# Patient Record
(demographics unavailable — no encounter records)

---

## 2025-03-03 NOTE — HISTORY OF PRESENT ILLNESS
[FreeTextEntry6] : Few days of itching and pink eyes.  No cold cough runny nose.  Child says began with body wash he used on hair, burned eyes. Mom thinks not from that.

## 2025-03-03 NOTE — PHYSICAL EXAM
[NL] : warm, clear [FreeTextEntry5] : mildly pink conjunctiva bilat. Lids mild pink bilat, more on R ,lower lids, no swelling. Itchy, rubs eyes  [FreeTextEntry3] : cerumen bilat

## 2025-03-03 NOTE — DISCUSSION/SUMMARY
[FreeTextEntry1] : Eye irritation bilat. Adenovirus in community but otherwise he feels well.   P Tx as allergy Zyrtec Zaditor To ophto if not improving in a few days. Cool compress, minimize rubbing.

## 2025-04-10 NOTE — HISTORY OF PRESENT ILLNESS
[NO] : No [Mother] : mother [Normal] : Normal [Brushing teeth] : Brushing teeth [Yes] : Patient goes to dentist yearly [Grade ___] : Grade [unfilled] [No] : No cigarette smoke exposure [Car seat in back seat] : Car seat in back seat [Carbon Monoxide Detectors] : Carbon monoxide detectors [Smoke Detectors] : Smoke detectors [Up to date] : Up to date [de-identified] : all foods [FreeTextEntry1] : JUAN J RAMOS is a 6 year here for well  runny nose and sneezing itchy eyes last two weeks no fever, improved with zyrtec

## 2025-04-10 NOTE — PHYSICAL EXAM
[Alert] : alert [No Acute Distress] : no acute distress [Normocephalic] : normocephalic [Conjunctivae with no discharge] : conjunctivae with no discharge [PERRL] : PERRL [EOMI Bilateral] : EOMI bilateral [Auricles Well Formed] : auricles well formed [Clear Tympanic membranes with present light reflex and bony landmarks] : clear tympanic membranes with present light reflex and bony landmarks [No Discharge] : no discharge [Nares Patent] : nares patent [Pink Nasal Mucosa] : pink nasal mucosa [Palate Intact] : palate intact [Nonerythematous Oropharynx] : nonerythematous oropharynx [Supple, full passive range of motion] : supple, full passive range of motion [No Palpable Masses] : no palpable masses [Symmetric Chest Rise] : symmetric chest rise [Clear to Auscultation Bilaterally] : clear to auscultation bilaterally [Regular Rate and Rhythm] : regular rate and rhythm [Normal S1, S2 present] : normal S1, S2 present [No Murmurs] : no murmurs [+2 Femoral Pulses] : +2 femoral pulses [Soft] : soft [NonTender] : non tender [Non Distended] : non distended [Normoactive Bowel Sounds] : normoactive bowel sounds [No Hepatomegaly] : no hepatomegaly [No Splenomegaly] : no splenomegaly [Micheal: _____] : Micheal [unfilled] [Circumcised] : circumcised [Testicles Descended Bilaterally] : testicles descended bilaterally [Patent] : patent [No fissures] : no fissures [No Abnormal Lymph Nodes Palpated] : no abnormal lymph nodes palpated [No Gait Asymmetry] : no gait asymmetry [No pain or deformities with palpation of bone, muscles, joints] : no pain or deformities with palpation of bone, muscles, joints [Normal Muscle Tone] : normal muscle tone [Straight] : straight [+2 Patella DTR] : +2 patella DTR [Cranial Nerves Grossly Intact] : cranial nerves grossly intact [No Rash or Lesions] : no rash or lesions

## 2025-04-10 NOTE — DISCUSSION/SUMMARY
[FreeTextEntry1] : Continue balanced diet with all food groups. Brush teeth twice a day with toothbrush. Recommend visit to dentist. Help child to maintain consistent daily routines and sleep schedule. School discussed. Ensure home is safe. Teach child about personal safety. Use consistent, positive discipline. Limit screen time to no more than 2 hours per day. Encourage physical activity. Child needs to ride in a belt-positioning booster seat until  4 feet 9 inches has been reached and are between 8 and 12 years of age.   Return 1 year for routine well child check. seasonal allergies, continue zyrtec thru the season, until 1st heat wave. May use patanol eye gtts

## 2025-04-10 NOTE — HISTORY OF PRESENT ILLNESS
[NO] : No [Mother] : mother [Normal] : Normal [Brushing teeth] : Brushing teeth [Yes] : Patient goes to dentist yearly [Grade ___] : Grade [unfilled] [No] : No cigarette smoke exposure [Car seat in back seat] : Car seat in back seat [Carbon Monoxide Detectors] : Carbon monoxide detectors [Smoke Detectors] : Smoke detectors [Up to date] : Up to date [de-identified] : all foods [FreeTextEntry1] : JUAN J RAMOS is a 6 year here for well  runny nose and sneezing itchy eyes last two weeks no fever, improved with zyrtec

## 2025-05-14 NOTE — HISTORY OF PRESENT ILLNESS
[EENT/Resp Symptoms] : EENT/RESPIRATORY SYMPTOMS [Sore Throat] : sore throat [___ Day(s)] : [unfilled] day(s) [Sick Contacts: ___] : sick contacts: [unfilled] [Fever] : no fever [Vomiting] : no vomiting [Diarrhea] : no diarrhea

## 2025-07-01 NOTE — PHYSICAL EXAM
[NL] : left tympanic membrane clear, right tympanic membrane clear [Erythematous Oropharynx] : erythematous oropharynx [de-identified] : rash on chest